# Patient Record
(demographics unavailable — no encounter records)

---

## 2024-10-22 NOTE — HISTORY OF PRESENT ILLNESS
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 Was counseled extensively on Chemotherapy which patient objected to on multiple occasions. Patient presented with abdominal pain and admitted to [facility]yn [facility]c for evaluation workup after CTAP imaging showing growth in soft tissue mass 6.0 x 4.7 cm (previously 4.0 x 3.3 cm), which invades and extends into the lumen of the adjacent distal sigmoid colon (no bowel obstruction) as well as severe left hydroureteronephrosis to the level of the pelvic mass. Patient received colonoscopy with GI revealing large, exophytic, polypoid, fungating and ulcerated malignant mass, pending biopsy results. Patient received perfusion scan for evaluation of L kidney function for severe hydroureteronephrosis.  Patient was seen by Dr. Tejeda and is now being referred for consultation to discuss rectal mass bx .   Denies any recent SOB, CP, fever, chills, n/v/d. ??/

## 2024-10-30 NOTE — HISTORY OF PRESENT ILLNESS
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 Was counseled extensively on Chemotherapy which patient objected to on multiple occasions. Patient presented with abdominal pain and admitted to [facility]yn Onc for evaluation workup after CTAP imaging showing growth in soft tissue mass 6.0 x 4.7 cm (previously 4.0 x 3.3 cm), which invades and extends into the lumen of the adjacent distal sigmoid colon (no bowel obstruction) as well as severe left hydroureteronephrosis to the level of the pelvic mass. Patient received colonoscopy with GI revealing large, exophytic, polypoid, fungating and ulcerated malignant mass, pending biopsy results. Patient received perfusion scan for evaluation of L kidney function for severe hydroureteronephrosis.  Patient was seen by Dr. Tejeda and is now being referred for consultation to discuss rectal mass bx .   Denies any recent SOB, CP, fever, chills, n/v/d. ??/